# Patient Record
Sex: MALE | Race: BLACK OR AFRICAN AMERICAN
[De-identification: names, ages, dates, MRNs, and addresses within clinical notes are randomized per-mention and may not be internally consistent; named-entity substitution may affect disease eponyms.]

---

## 2017-04-28 ENCOUNTER — HOSPITAL ENCOUNTER (OUTPATIENT)
Age: 67
Discharge: HOME | End: 2017-04-28
Payer: MEDICARE

## 2017-04-28 DIAGNOSIS — R10.11: Primary | ICD-10-CM

## 2017-05-02 ENCOUNTER — HOSPITAL ENCOUNTER (OUTPATIENT)
Age: 67
Discharge: HOME | End: 2017-05-02
Payer: MEDICARE

## 2017-05-02 DIAGNOSIS — R10.11: Primary | ICD-10-CM

## 2017-05-10 ENCOUNTER — HOSPITAL ENCOUNTER (OUTPATIENT)
Dept: HOSPITAL 76 - DI | Age: 67
Discharge: HOME | End: 2017-05-10
Attending: INTERNAL MEDICINE
Payer: MEDICARE

## 2017-05-10 DIAGNOSIS — R10.84: Primary | ICD-10-CM

## 2017-05-10 DIAGNOSIS — R74.8: ICD-10-CM

## 2017-05-10 PROCEDURE — 74170 CT ABD WO CNTRST FLWD CNTRST: CPT

## 2017-05-10 NOTE — CT REPORT
CT OF THE ABDOMEN WITH AND WITHOUT CONTRAST: 05/10/2017

 

CLINICAL INDICATION: Pain, abnormal lipase. 

 

TECHNIQUE: Axial CT images of the abdomen were obtained prior to and following 50 mL of Isovue-300 in
travenously (renal dysfunction), with imaging obtained in arterial and portovenous phases. No previou
s CT is available for comparison. 

 

FINDINGS:  Limited evaluation of the lung bases is unremarkable. 

 

ABDOMEN: On the unenhanced images, there is no evidence of pancreatic calcifications. The pancreas de
monstrates homogeneous arterial enhancement and no evidence of mass lesion on arterial or portovenous
 phases. No surrounding inflammation is seen. No fluid collection is appreciated. The liver demonstra
kelly cysts. Cortical cysts are seen in the kidneys. No hydronephrosis or nephrolithiasis is appreciate
d. The spleen and adrenal glands appear unremarkable. The gallbladder is not dilated. No bowel dilata
tion, free gas, or free fluid is appreciated. The osseous structures demonstrate degenerative changes
. No abdominal adenopathy is seen. 

 

IMPRESSION:  NO EVIDENCE OF PANCREATIC MASS OR PANCREATITIS. INCIDENTAL HEPATIC AND RENAL CYSTS. 

 

In accordance with CT protocol optimization, one or more of the following dose reduction techniques w
ere utilized for this exam:  automated exposure control, adjustment of mA and/or KV based on patient 
size, or use of iterative reconstructive technique. 

 

 

 

DD:05/10/2017 14:54:25  DT: 05/10/2017 16:07  JOB #: [id number]  EXT JOB #:[id number]

## 2017-05-16 ENCOUNTER — HOSPITAL ENCOUNTER (OUTPATIENT)
Dept: HOSPITAL 76 - LAB.N | Age: 67
Discharge: HOME | End: 2017-05-16
Attending: INTERNAL MEDICINE
Payer: MEDICARE

## 2017-05-16 DIAGNOSIS — R74.8: Primary | ICD-10-CM

## 2017-05-16 PROCEDURE — 83690 ASSAY OF LIPASE: CPT

## 2017-05-16 PROCEDURE — 36415 COLL VENOUS BLD VENIPUNCTURE: CPT

## 2018-09-24 ENCOUNTER — HOSPITAL ENCOUNTER (OUTPATIENT)
Dept: HOSPITAL 76 - DI | Age: 68
Discharge: HOME | End: 2018-09-24
Attending: OPHTHALMOLOGY
Payer: MEDICARE

## 2018-09-24 DIAGNOSIS — I51.7: ICD-10-CM

## 2018-09-24 DIAGNOSIS — G45.3: Primary | ICD-10-CM

## 2018-09-24 LAB
BASOPHILS NFR BLD AUTO: 0.1 10^3/UL (ref 0–0.1)
BASOPHILS NFR BLD AUTO: 1.2 %
EOSINOPHIL # BLD AUTO: 0 10^3/UL (ref 0–0.7)
EOSINOPHIL NFR BLD AUTO: 0.6 %
ERYTHROCYTE [DISTWIDTH] IN BLOOD BY AUTOMATED COUNT: 12.9 % (ref 12–15)
HGB UR QL STRIP: 12.2 G/DL (ref 14–18)
LYMPHOCYTES # SPEC AUTO: 1.1 10^3/UL (ref 1.5–3.5)
LYMPHOCYTES NFR BLD AUTO: 15.7 %
MCH RBC QN AUTO: 32.7 PG (ref 27–31)
MCHC RBC AUTO-ENTMCNC: 33.9 G/DL (ref 32–36)
MCV RBC AUTO: 96.5 FL (ref 80–94)
MONOCYTES # BLD AUTO: 0.6 10^3/UL (ref 0–1)
MONOCYTES NFR BLD AUTO: 8.2 %
NEUTROPHILS # BLD AUTO: 5.4 10^3/UL (ref 1.5–6.6)
NEUTROPHILS # SNV AUTO: 7.2 X10^3/UL (ref 4.8–10.8)
NEUTROPHILS NFR BLD AUTO: 74.3 %
PDW BLD AUTO: 7.1 FL (ref 7.4–11.4)
PLATELET # BLD: 320 10^3/UL (ref 130–450)
RBC MAR: 3.74 10^6/UL (ref 4.7–6.1)

## 2018-09-24 PROCEDURE — 86140 C-REACTIVE PROTEIN: CPT

## 2018-09-24 PROCEDURE — 93880 EXTRACRANIAL BILAT STUDY: CPT

## 2018-09-24 PROCEDURE — 85651 RBC SED RATE NONAUTOMATED: CPT

## 2018-09-24 PROCEDURE — 93306 TTE W/DOPPLER COMPLETE: CPT

## 2018-09-24 PROCEDURE — 36415 COLL VENOUS BLD VENIPUNCTURE: CPT

## 2018-09-24 PROCEDURE — 85025 COMPLETE CBC W/AUTO DIFF WBC: CPT

## 2018-09-24 NOTE — ULTRASOUND REPORT
Reason:  AMAUROSIS FUGAX

Procedure Date:  09/24/2018   

Accession Number:  [id number] / [id number]                    

Procedure:  US  - Carotid Doppler Complete CPT Code:  

 

FULL RESULT:

 

 

EXAM:

BILATERAL CAROTID AND VERTEBRAL ARTERY DUPLEX DOPPLER ULTRASOUND:

 

EXAM DATE: 9/24/2018 11:59 AM

 

CLINICAL HISTORY: Amaurosis fugax.

 

COMPARISON: None.

 

TECHNIQUE: Grayscale imaging, color Doppler, and duplex spectral Doppler 

were used to evaluate the carotid and vertebral arteries bilaterally. 

Static images were obtained.

 

FINDINGS: Subjectively there is intimal thickening and less than 50% 

luminal narrowing by hypoechoic plaque in the right carotid system 

predominantly in the bulb region as well as in the internal carotid 

artery distally. Shadowing atherosclerotic plaque is seen in the proximal 

external carotid artery on the right with normal waveforms downstream. 

The left carotid system demonstrates less than 50% hypoechoic plaque 

subjectively in the bulb region. Normal antegrade flow is present in 

bilateral vertebral arteries.

 

VELOCITIES (cm/sec):

 

Right

CCA Mid:  cm/sec

CCA Dist: PSV 88 cm/sec

ICA Prox: PSV 81 cm/sec, EDV 9 cm/sec

ICA Mid: PSV 90 cm/sec, EDV 19 cm/sec

ICA Dist: PSV 77 cm/sec, EDV 17 cm/sec

ECA:  cm/sec

Vert: PSV 40 cm/sec

ICA/CCA: 0.82

 

Left

CCA Mid:  cm/sec

CCA Dist: PSV 85 cm/sec

ICA Prox: PSV 62 cm/sec, EDV 12 cm/sec

ICA Mid: PSV 71 cm/sec, EDV 15 cm/sec

ICA Dist: PSV 53 cm/sec, EDV 15 cm/sec

ECA: PSV 87 cm/sec

Vert:  cm/sec

ICA/CCA: 0.58

 

ICA diameter stenosis:

Right: <50% by velocity and <70% by NASCET criteria.

Left: <50% by velocity and <70% by NASCET criteria.

IMPRESSION:

1. No significant bilateral carotid artery plaquing.

2. In the right carotid artery there are no elevated carotid artery 

velocities to suggest hemodynamically significant stenosis.

3. In the left carotid artery there are no elevated carotid artery 

velocities to suggest hemodynamically significant stenosis.

4. Normal antegrade flow is present in bilateral vertebral arteries.

 

General Recommendations:

 

Stenosis =50% ICA - Follow-up ultrasound 6-12 months

Stenosis <50% ICA - High Risk Patient with plaque - Follow-up ultrasound 

1-2 years

Normal Study but High Risk Patient - Follow-up ultrasound 3-5 years

 

Management recommendations and diagnostic criteria are based on current 

IAC endorsed standards in Carotid Artery Stenosis: Grayscale and Doppler 

Ultrasound Diagnosis.

Validated velocity measurements with angiographic measurements and 

velocity criteria are extrapolated from diameter data as defined by the 

Society of Radiologists in Ultrasound Consensus Conference Radiology 

2003; 229;340-346.

 

[facility]

## 2018-10-18 ENCOUNTER — HOSPITAL ENCOUNTER (OUTPATIENT)
Dept: HOSPITAL 76 - DI | Age: 68
Discharge: HOME | End: 2018-10-18
Attending: INTERNAL MEDICINE
Payer: MEDICARE

## 2018-10-18 DIAGNOSIS — I65.23: ICD-10-CM

## 2018-10-18 DIAGNOSIS — H34.9: Primary | ICD-10-CM

## 2018-10-18 PROCEDURE — 70496 CT ANGIOGRAPHY HEAD: CPT

## 2018-10-18 PROCEDURE — 70498 CT ANGIOGRAPHY NECK: CPT

## 2018-10-18 NOTE — CT REPORT
Reason:  RETINAL ARTERY EMBOLUS

Procedure Date:  10/18/2018   

Accession Number:  [id number] / [id number]                    

Procedure:  CT  - Neck Angio CPT Code:  

 

FULL RESULT:

 

 

EXAM:

CT ANGIOGRAM NECK

 

EXAM DATE: 10/18/2018 09:04 AM.

 

CLINICAL HISTORY: 67-year-old man with retinal artery embolus.

 

COMPARISON: None.

 

TECHNIQUE: Routine axial helical imaging was performed from the skull 

base through the aortic arch. Reconstructions: Routine multiplanar 3D MIP 

reconstructions. IV Contrast: ISOVUE 300  80mL. Evaluation of arterial 

stenosis is based on a NASCET method of measurement.

 

In accordance with CT protocol optimization, one or more of the following 

dose reduction techniques were utilized for this exam: automated exposure 

control, adjustment of mA and/or KV based on patient size, or use of 

iterative reconstructive technique.

 

FINDINGS:

RIGHT:

- Common and Internal Carotid: Patent without signficant stenosis. There 

is calcified atherosclerotic plaque at the bifurcation. Stenosis by 

NASCET criteria: Less than 50%. No evidence of dissection. No evidence of 

aneurysm along intracranial ICA.

- External Carotid: Unremarkable.

 

- Vertebral: Patent without significant stenosis. No evidence of 

dissection.

 

LEFT:

- Common and Internal Carotid: Patent without signficant stenosis. There 

is calcified atherosclerotic plaque at the bifurcation. Stenosis by 

NASCET criteria: Less than 50%. No evidence of dissection. No evidence of 

aneurysm along intracranial ICA.

- External Carotid: Unremarkable.

 

- Vertebral: Patent without significant stenosis. No evidence of 

dissection.

 

SOFT TISSUES AND BONES: Visualized soft tissues are unremarkable. Lung 

apices are clear. No evidence of acute fracture or malalignment of the 

cervical spine, but multilevel degenerative changes are present.

IMPRESSION:

1. Atherosclerotic plaque is present along the carotid bifurcations 

bilaterally, but stenosis is less than 50% by NASCET criteria.

2. Vertebral arteries are patent without significant stenosis.

 

[facility]

## 2018-10-18 NOTE — CT REPORT
Reason:  RETINAL ARTERY EMBOLUS

Procedure Date:  10/18/2018   

Accession Number:  [id number] / [id number]                    

Procedure:  CT  - Head Angio CPT Code:  

 

FULL RESULT:

 

 

EXAM:

CT ANGIOGRAM HEAD.

CT SCAN OF THE HEAD WITHOUT CONTRAST.

 

EXAM DATE: 10/18/2018 09:04 AM

 

CLINICAL HISTORY: 67-year-old man with retinal artery embolus.

 

COMPARISON: None.

 

TECHNIQUE:

- CT Scan Head: Using a multidetector scanner, axial images were acquired 

from the foramen magnum to the skull vertex prior to contrast 

administration.

- CT Angiogram: Using a multidetector scanner, high-resolution axial 

images were acquired from the skull base through vertex following rapid 

infusion of intravenous contrast. Reformats: Multiplanar MIP reformats 

were reconstructed. Nascet criteria used for stenosis measurement.

 

IV Contrast: ISOVUE 300  80mL.

 

In accordance with CT protocol optimization, one or more of the following 

dose reduction techniques were utilized for this exam: automated exposure 

control, adjustment of mA and/or KV based on patient size, or use of 

iterative reconstructive technique.

 

FINDINGS:

NONCONTRAST HEAD:

Parenchyma: No evidence of acute infarct, hemorrhage, or mass lesion. 

Parenchyma demonstrates normal attenuation characteristics.

 

Ventricles and Extra-axial Spaces: Ventricles are symmetric and normal in 

size for age. No extra-axial hemorrhage or fluid collection.

 

Orbits: Unremarkable.

 

Sinuses: Paranasal sinuses and mastoid air cells are clear.

 

Extracranial Soft Tissues and Bones: Soft tissues are unremarkable. No 

fractures.

 

CTA HEAD:

RIGHT:

- Visualized Internal Carotid: Patent without significant stenosis or 

aneurysm. There is mild atherosclerotic plaque along the siphon.

 

- Anterior Cerebral: Patent without significant stenosis or aneurysm.

- Middle Cerebral: Patent without significant stenosis or aneurysm.

- Posterior Cerebral: Patent without significant stenosis or aneurysm.

- Posterior Communicating: Not well seen.

 

- Visualized Vertebral: Patent without significant stenosis or dissection.

 

LEFT:

- Visualized Internal Carotid: Patent without significant stenosis or 

aneurysm. There is mild atherosclerotic plaque along the siphon.

 

- Anterior Cerebral: Patent without significant stenosis or aneurysm.

- Middle Cerebral: Patent without significant stenosis or aneurysm.

- Posterior Cerebral: Patent without significant stenosis or aneurysm.

- Posterior Communicating: Not well seen.

 

- Visualized Vertebral: Patent without significant stenosis or dissection.

 

CENTRAL:

- Anterior Communicating: Patent. No aneurysm.

- Basilar: Patent without significant stenosis, dissection, or aneurysm.

 

Dural Venous Sinuses and Major Central Veins: Patent.

IMPRESSION:

CT Head:

1. No acute intracranial abnormality. Specifically, no evidence of acute 

infarct, hemorrhage, or mass lesion.

 

CTA Head:

1. No large vessel occlusion, significant vascular stenosis, or aneurysm.

 

[facility]IA

## 2020-11-10 ENCOUNTER — HOSPITAL ENCOUNTER (OUTPATIENT)
Dept: HOSPITAL 76 - DI | Age: 70
Discharge: HOME | End: 2020-11-10
Attending: INTERNAL MEDICINE
Payer: MEDICARE

## 2020-11-10 DIAGNOSIS — N28.1: ICD-10-CM

## 2020-11-10 DIAGNOSIS — N17.9: Primary | ICD-10-CM

## 2020-11-10 PROCEDURE — 76770 US EXAM ABDO BACK WALL COMP: CPT

## 2020-11-11 NOTE — ULTRASOUND REPORT
PROCEDURE:  Retroperitoneal

 

INDICATIONS:  ACUTE KINDEY INJURY

 

TECHNIQUE:  

Real-time scanning was performed of the retroperitoneal organs, with image documentation.  

 

COMPARISON:  CT abdomen pelvis 5/10/2017, renal ultrasound 6/18/2007, 7/9/2008

 

FINDINGS:     

 

Kidneys:  Kidneys are normal in size.  Right kidney measures 11.9 cm long; left kidney measures 1.1 c
m long.  Right renal cortical thickness is 1.9 cm; left renal cortical thickness is 1.5 cm.  No solid
 masses, hydronephrosis, or nephrolithiasis.  Multifocal areas of low echogenicity are identified wit
hin the kidneys bilaterally. Overall, they appear unchanged. It is noted within the left kidney there
 are 2 foci appearing to demonstrate heterogeneous echogenicity measuring 20 x 16 x 17 mm and 16 x 12
 x 13 mm. They do not demonstrate increased vascularity.

 

Bladder: Prevoid volume 3 29 cc. Postvoid residual 69 cc. Bilateral ureteral jets are identified. No 
masses are identified in the bladder.

 

 IMPRESSION:  

1. Multifocal simple cysts within the kidneys appearing overall stable compared to prior exam. As not
ed above, there are two complex foci identified without increased vascularity. These appear most sugg
estive of complex cyst. 6 month interval ultrasound follow-up is recommended to document stability.

 

Reviewed by: Starla Brody MD on 11/11/2020 3:45 PM PST

Approved by: Starla Brody MD on 11/11/2020 3:45 PM PST

 

 

Station ID:  [id number]

## 2022-07-11 ENCOUNTER — HOSPITAL ENCOUNTER (OUTPATIENT)
Dept: HOSPITAL 76 - DI | Age: 72
Discharge: HOME | End: 2022-07-11
Attending: INTERNAL MEDICINE
Payer: MEDICARE

## 2022-07-11 DIAGNOSIS — N17.9: Primary | ICD-10-CM

## 2022-07-11 DIAGNOSIS — N28.1: ICD-10-CM

## 2022-07-12 NOTE — ULTRASOUND REPORT
PROCEDURE:  Retroperitoneal

 

INDICATIONS:  ACUTE KIDNEY FAILURE

 

TECHNIQUE:  

Real-time scanning was performed of the retroperitoneal organs, with image documentation.  

 

COMPARISON:  Renal ultrasound 11/10/2020. CT 5/10/2017.

 

FINDINGS:  

 

Kidneys:  Kidneys are normal in size.  Right kidney measures 11.0 cm long; left kidney measures 11.0 
cm long.  Right renal cortical thickness is 1.7 cm; left renal cortical thickness is 1.3 cm. Both kid
neys appear echogenic. No stones visualized. No hydronephrosis. Multiple simple appearing cortical cy
sts are present in the right kidney as before. In the left kidney, there are 2 small structures which
 are indeterminate for solid masses or complex cyst. One at the upper kidney measures 1.7 x 1.6 x 1.7
 cm. The other is at the upper-mid kidney measuring 1.8 x 1.4 x 1.9 cm. These may correspond to the p
reviously demonstrated indeterminate left renal lesions but comparison is difficult.

 

Bladder:  Pre-void bladder volume is 372 mL.  Post-void residual is 63 mL.  Pre-void images demonstra
te no intraluminal masses or stones.  On pre-void images, both ureteral jets are noted with color Dop
pler interrogation.  (Of note, ureteral jets may not be detectable in up to 25% of cases due to insuf
ficient differences in specific gravity between ureteral and bladder urine).  

 

Miscellaneous:  No free abdominal fluid.  

 

IMPRESSION:  

1. No hydronephrosis.

2. Both kidneys appear echogenic, a finding that can be seen in the setting of medical renal disease.


3. Postvoid residual urinary bladder volume of 63 mL.

4. Two small indeterminate structures are present in the left kidney, unclear if these represent comp
lennox cysts versus solid masses. Characterization with renal protocol MRI may be helpful. Otherwise, at
tention on follow-up exams is recommended, could consider an interval of 3-6 months or at clinical di
scretion. 

 

Reviewed by: Gopal Ya MD on 7/12/2022 10:50 AM PDT

Approved by: Gopal Ya MD on 7/12/2022 10:50 AM PDT

 

 

Station ID:  [id number]